# Patient Record
Sex: FEMALE | Race: WHITE | NOT HISPANIC OR LATINO | Employment: UNEMPLOYED | URBAN - METROPOLITAN AREA
[De-identification: names, ages, dates, MRNs, and addresses within clinical notes are randomized per-mention and may not be internally consistent; named-entity substitution may affect disease eponyms.]

---

## 2019-05-10 RX ORDER — BACLOFEN 10 MG/1
10 TABLET ORAL 3 TIMES DAILY
Status: ON HOLD | COMMUNITY
End: 2019-05-20

## 2019-05-10 RX ORDER — VALACYCLOVIR HYDROCHLORIDE 1 G/1
1000 TABLET, FILM COATED ORAL 2 TIMES DAILY
COMMUNITY
End: 2019-05-13

## 2019-05-10 RX ORDER — ROPINIROLE 3 MG/1
3 TABLET, FILM COATED ORAL NIGHTLY
COMMUNITY
End: 2022-06-07 | Stop reason: SDUPTHER

## 2019-05-10 RX ORDER — LISINOPRIL AND HYDROCHLOROTHIAZIDE 25; 20 MG/1; MG/1
1 TABLET ORAL NIGHTLY
COMMUNITY
End: 2022-03-31 | Stop reason: SDUPTHER

## 2019-05-10 RX ORDER — TRAZODONE HYDROCHLORIDE 50 MG/1
50 TABLET ORAL NIGHTLY
COMMUNITY
End: 2019-05-13

## 2019-05-13 ENCOUNTER — OFFICE VISIT (OUTPATIENT)
Dept: NEUROSURGERY | Facility: CLINIC | Age: 54
End: 2019-05-13

## 2019-05-13 VITALS — HEIGHT: 66 IN | RESPIRATION RATE: 17 BRPM | BODY MASS INDEX: 33.94 KG/M2 | WEIGHT: 211.2 LBS

## 2019-05-13 DIAGNOSIS — G89.29 CHRONIC BILATERAL LOW BACK PAIN WITH RIGHT-SIDED SCIATICA: Primary | ICD-10-CM

## 2019-05-13 DIAGNOSIS — M54.41 CHRONIC BILATERAL LOW BACK PAIN WITH RIGHT-SIDED SCIATICA: Primary | ICD-10-CM

## 2019-05-13 DIAGNOSIS — Z98.1 HISTORY OF LUMBAR FUSION: ICD-10-CM

## 2019-05-13 DIAGNOSIS — F32.A DEPRESSION, UNSPECIFIED DEPRESSION TYPE: ICD-10-CM

## 2019-05-13 DIAGNOSIS — M47.816 LUMBAR SPONDYLOSIS: ICD-10-CM

## 2019-05-13 PROCEDURE — 99204 OFFICE O/P NEW MOD 45 MIN: CPT | Performed by: NEUROLOGICAL SURGERY

## 2019-05-13 RX ORDER — IBUPROFEN 800 MG/1
800 TABLET ORAL 3 TIMES DAILY
COMMUNITY
End: 2022-05-09 | Stop reason: SDUPTHER

## 2019-05-13 RX ORDER — GABAPENTIN 300 MG/1
300 CAPSULE ORAL DAILY
Status: ON HOLD | COMMUNITY
End: 2019-05-20

## 2019-05-13 NOTE — PATIENT INSTRUCTIONS
Before your next appointment with us please complete the following:  -Appointment with Dr. Negron for a couple injections.   -Appointment with Dr. Morocho-Psychologist.

## 2019-05-13 NOTE — H&P (VIEW-ONLY)
NAME: TESFAYE FRANCE   DOS: 2019  : 1965  PCP: Camelia Chen PA    Chief Complaint:    Chief Complaint   Patient presents with   • Low back & R-leg pain       History of Present Illness:  53 y.o. female   This is a patient with a history of complex back.  She underwent lumbar laminectomy and synovial cyst resection by Dr. Bernal some years ago and subsequently re-presented with an L4-5 lumbar interbody fusion.  She self reports that she did well with this she is had a recent left shoulder issue and has had complex back pain ever since.  She reports worsening right lower extremity pain that is threatening her job she is currently filing for disability and denies any symptoms of cauda equina syndrome the pain is mostly present all the time she denies any symptoms of cauda equina syndrome and it radiates to her anterior thigh as well as down her leg she is here for evaluation    PMHX  Allergies:  Allergies   Allergen Reactions   • Latex Rash     Medications    Current Outpatient Medications:   •  ibuprofen (ADVIL,MOTRIN) 800 MG tablet, Take 800 mg by mouth Every 6 (Six) Hours As Needed for Mild Pain ., Disp: , Rfl:   •  baclofen (LIORESAL) 10 MG tablet, Take 10 mg by mouth 3 (Three) Times a Day., Disp: , Rfl:   •  lisinopril-hydrochlorothiazide (PRINZIDE,ZESTORETIC) 20-25 MG per tablet, Take 1 tablet by mouth Daily., Disp: , Rfl:   •  rOPINIRole (REQUIP) 3 MG tablet, Take 3 mg by mouth Every Night., Disp: , Rfl:   Past Medical History:  Past Medical History:   Diagnosis Date   • Hypertension    • Low back pain    • RLS (restless legs syndrome)    • Vitamin D deficiency      Past Surgical History:  Past Surgical History:   Procedure Laterality Date   • LUMBAR FUSION Right 2013    L4-5 fusion- Dr. Kei Bernal   • LUMBAR SPINE SURGERY Left 2010    L-L4-5 hemilaminectomies, facetectomy, foraminotomy & synovial cyst resection- Dr. Kei Bernal    • TOTAL SHOULDER REPLACEMENT Left      Social  Hx:  Social History     Tobacco Use   • Smoking status: Never Smoker   • Smokeless tobacco: Never Used   Substance Use Topics   • Alcohol use: No     Frequency: Never   • Drug use: No     Family Hx:  Family History   Problem Relation Age of Onset   • Hypertension Mother    • Diabetes Father      Review of Systems:        Review of Systems   Constitutional: Positive for activity change, appetite change and fatigue. Negative for chills, diaphoresis, fever and unexpected weight change.   HENT: Negative for congestion, dental problem, drooling, ear discharge, ear pain, facial swelling, hearing loss, mouth sores, nosebleeds, postnasal drip, rhinorrhea, sinus pressure, sneezing, sore throat, tinnitus, trouble swallowing and voice change.    Eyes: Negative for photophobia, pain, discharge, redness, itching and visual disturbance.   Respiratory: Negative for apnea, cough, choking, chest tightness, shortness of breath, wheezing and stridor.    Cardiovascular: Negative for chest pain, palpitations and leg swelling.   Gastrointestinal: Negative for abdominal distention, abdominal pain, anal bleeding, blood in stool, constipation, diarrhea, nausea, rectal pain and vomiting.   Endocrine: Negative for cold intolerance, heat intolerance, polydipsia, polyphagia and polyuria.   Genitourinary: Negative for decreased urine volume, difficulty urinating, dysuria, enuresis, flank pain, frequency, genital sores, hematuria and urgency.   Musculoskeletal: Positive for arthralgias, back pain, gait problem, joint swelling and myalgias. Negative for neck pain and neck stiffness.   Skin: Negative for color change, pallor, rash and wound.   Allergic/Immunologic: Negative for environmental allergies, food allergies and immunocompromised state.   Neurological: Positive for weakness. Negative for dizziness, tremors, seizures, syncope, facial asymmetry, speech difficulty, light-headedness, numbness and headaches.   Hematological: Negative for  adenopathy. Does not bruise/bleed easily.   Psychiatric/Behavioral: Positive for decreased concentration and sleep disturbance. Negative for agitation, behavioral problems, confusion, dysphoric mood, hallucinations, self-injury and suicidal ideas. The patient is not nervous/anxious and is not hyperactive.    All other systems reviewed and are negative.       I have reviewed this note template and all pertinent parts of the review of systems social, family history, surgical history and medication list    Physical Examination:  Vitals:    05/13/19 0933   Resp: 17      General Appearance:   Well developed, well nourished, well groomed, alert, and cooperative.  Neurological examination:  Neurologic Exam  Vital signs were reviewed and documented in the chart  Patient appeared in good neurologic function with normal comprehension fluent speech  Mood and affect are normal  Sense of smell deferred    Pupils symmetric equally reactive funduscopic exam not visualized   Visual fields intact to confrontation  Extraocular movements intact  Face motor function is symmetric  Facial sensations normal  Hearing intact to finger rub hearing intact to finger rub  Tongue is midline  Palate symmetric  Swallowing normal  Shoulder shrug normal    Muscle bulk and tone normal  5 out of 5 strength no motor drift  Gait normal intact  Negative Romberg  No clonus long tract signs or myelopathy    Reflexes symmetric trace throughout  No edema noted and extremities skin appears normal    Straight leg raise sign absent  No signs of intrinsic hip dysfunction  Back is without any lesions or abnormality incision well-healed  Feet are warm and well perfused        Review of Imaging/DATA:  I reviewed an MRI of her lumbar spine she has fusion with 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 Pizarro is a 53 y.o. female   I had an extensive discussion with her  for at least 45 minutes.  She is a complex patient she is currently filing for disability she works 3 jobs and this is threatening her job she is frustrated to the point of being tearful during our visit and has a history of chronic opioid use in the past which she is resolving and pain and 2 prior back surgeries.  I try to set forth appropriate expectations I think she is likely to be a surgical candidate.  We need to set her up with a person to manage her chronic pain management needs she needs to see Dr. Gutierrez for psychological clearance before undergoing any additional surgeries and lastly she needs a lumbar myelogram and EMG to delineate whether or not she needs an L3-4 or an L2-3-4 adjacent level revision back surgery.    I talked about the surgery the surgical risks of the impact on her quality of life etc. she seemed a bit frustrated with me as I suspected that she thought that this would be an easier fix but I think she is can have problems that need ongoing management.  We will do everything we can to help her in a pleasure of our neurosurgical care him to see her back after the studies are complete.

## 2019-05-14 ENCOUNTER — TELEPHONE (OUTPATIENT)
Dept: NEUROSURGERY | Facility: CLINIC | Age: 54
End: 2019-05-14

## 2019-05-14 PROBLEM — M54.41 CHRONIC BILATERAL LOW BACK PAIN WITH RIGHT-SIDED SCIATICA: Status: ACTIVE | Noted: 2019-05-14

## 2019-05-14 PROBLEM — G89.29 CHRONIC BILATERAL LOW BACK PAIN WITH RIGHT-SIDED SCIATICA: Status: ACTIVE | Noted: 2019-05-14

## 2019-05-14 NOTE — TELEPHONE ENCOUNTER
Patient needs work note for missing yesterday. She was seen by Dr. Osorio.     Work note printed.  I will contact patient to see how she would like to obtain it.  Note to be faxed to [phone], rishi Viera.    Done

## 2019-05-15 ENCOUNTER — DOCUMENTATION (OUTPATIENT)
Dept: NEUROSURGERY | Facility: CLINIC | Age: 54
End: 2019-05-15

## 2019-05-20 ENCOUNTER — APPOINTMENT (OUTPATIENT)
Dept: CT IMAGING | Facility: HOSPITAL | Age: 54
End: 2019-05-20

## 2019-05-20 ENCOUNTER — HOSPITAL ENCOUNTER (OUTPATIENT)
Dept: NEUROLOGY | Facility: HOSPITAL | Age: 54
Discharge: HOME | End: 2019-05-20

## 2019-05-20 ENCOUNTER — HOSPITAL ENCOUNTER (OUTPATIENT)
Facility: HOSPITAL | Age: 54
Discharge: HOME | End: 2019-05-20
Attending: RADIOLOGY | Admitting: NEUROLOGICAL SURGERY

## 2019-05-20 VITALS
TEMPERATURE: 97.6 F | DIASTOLIC BLOOD PRESSURE: 85 MMHG | SYSTOLIC BLOOD PRESSURE: 109 MMHG | OXYGEN SATURATION: 100 % | BODY MASS INDEX: 33.94 KG/M2 | HEIGHT: 66 IN | HEART RATE: 74 BPM | WEIGHT: 211.2 LBS

## 2019-05-20 DIAGNOSIS — G89.29 CHRONIC BILATERAL LOW BACK PAIN WITH RIGHT-SIDED SCIATICA: ICD-10-CM

## 2019-05-20 DIAGNOSIS — M54.41 CHRONIC BILATERAL LOW BACK PAIN WITH RIGHT-SIDED SCIATICA: ICD-10-CM

## 2019-05-20 LAB — B-HCG UR QL: NEGATIVE

## 2019-05-20 PROCEDURE — 81025 URINE PREGNANCY TEST: CPT | Performed by: RADIOLOGY

## 2019-05-20 PROCEDURE — 62304 MYELOGRAPHY LUMBAR INJECTION: CPT | Performed by: RADIOLOGY

## 2019-05-20 PROCEDURE — 36415 COLL VENOUS BLD VENIPUNCTURE: CPT

## 2019-05-20 PROCEDURE — 95886 MUSC TEST DONE W/N TEST COMP: CPT

## 2019-05-20 PROCEDURE — 72132 CT LUMBAR SPINE W/DYE: CPT

## 2019-05-20 PROCEDURE — 0 IOPAMIDOL 41 % SOLUTION: Performed by: RADIOLOGY

## 2019-05-20 PROCEDURE — 95910 NRV CNDJ TEST 7-8 STUDIES: CPT

## 2019-05-20 RX ORDER — LIDOCAINE HYDROCHLORIDE 10 MG/ML
INJECTION, SOLUTION EPIDURAL; INFILTRATION; INTRACAUDAL; PERINEURAL AS NEEDED
Status: DISCONTINUED | OUTPATIENT
Start: 2019-05-20 | End: 2019-05-20 | Stop reason: HOSPADM

## 2019-05-21 ENCOUNTER — TELEPHONE (OUTPATIENT)
Dept: PAIN MEDICINE | Facility: CLINIC | Age: 54
End: 2019-05-21

## 2019-05-23 ENCOUNTER — TELEPHONE (OUTPATIENT)
Dept: PAIN MEDICINE | Facility: CLINIC | Age: 54
End: 2019-05-23

## 2019-05-30 ENCOUNTER — OFFICE VISIT (OUTPATIENT)
Dept: NEUROSURGERY | Facility: CLINIC | Age: 54
End: 2019-05-30

## 2019-05-30 VITALS
SYSTOLIC BLOOD PRESSURE: 120 MMHG | BODY MASS INDEX: 33.88 KG/M2 | WEIGHT: 210.8 LBS | RESPIRATION RATE: 16 BRPM | DIASTOLIC BLOOD PRESSURE: 90 MMHG | HEIGHT: 66 IN

## 2019-05-30 DIAGNOSIS — M48.062 SPINAL STENOSIS OF LUMBAR REGION WITH NEUROGENIC CLAUDICATION: Primary | ICD-10-CM

## 2019-05-30 DIAGNOSIS — M51.26 HNP (HERNIATED NUCLEUS PULPOSUS), LUMBAR: ICD-10-CM

## 2019-05-30 DIAGNOSIS — M51.36 DDD (DEGENERATIVE DISC DISEASE), LUMBAR: ICD-10-CM

## 2019-05-30 DIAGNOSIS — M54.16 LUMBAR RADICULOPATHY: ICD-10-CM

## 2019-05-30 PROCEDURE — 99215 OFFICE O/P EST HI 40 MIN: CPT | Performed by: NEUROLOGICAL SURGERY

## 2019-05-30 RX ORDER — GABAPENTIN 300 MG/1
300 CAPSULE ORAL 3 TIMES DAILY
Qty: 90 CAPSULE | Refills: 3 | OUTPATIENT
Start: 2019-05-30 | End: 2022-04-12 | Stop reason: ALTCHOICE

## 2019-05-30 NOTE — TELEPHONE ENCOUNTER
PER SUNNI-Gabapentin 300mg TID # 90-    3 refills called in to pt's pharmacy.     1 nightly for 3 days, 1 twice a day for 3 days, 1 three times a day thereafter      Please sign to reflect in epic

## 2019-05-30 NOTE — PROGRESS NOTES
NAME: TESFAYE FRANCE   DOS: 2019  : 1965  PCP: Glory Moody PA    Chief Complaint:    Chief Complaint   Patient presents with   • Low back pain- F/u Myelo & EMG       History of Present Illness:  53 y.o. female     I saw this very pleasant lady 53-year-old with a history of complex back issues she underwent a L4-5 laminectomy and subsequent lumbar fusion by Dr. Bernal she reported improvement of her pain and neurogenic claudication.  She had quite a bit of decreased range of motion her back for some time increasing back pain and subtle symptoms and overtones of neurogenic claudication including some leg pain as well as walking with the assistance of a cart for protracted periods of time.  She reports a significant bout of left L3-4 distribution pain that resolved with time I re-presented with intractable right lower extremity pain in the anterior thigh and probable L3 if not L4 distribution.  She reports some resolution of her leg symptoms she still has fatigue in her back and is here for follow-up after MRI, myelogram and EMG she denies any symptoms of cauda equina syndrome she is currently filing for disability  PMHX  Allergies:  Allergies   Allergen Reactions   • Latex Rash     Medications    Current Outpatient Medications:   •  ibuprofen (ADVIL,MOTRIN) 800 MG tablet, Take 800 mg by mouth 3 (Three) Times a Day., Disp: , Rfl:   •  lisinopril-hydrochlorothiazide (PRINZIDE,ZESTORETIC) 20-25 MG per tablet, Take 1 tablet by mouth Every Night., Disp: , Rfl:   •  rOPINIRole (REQUIP) 3 MG tablet, Take 3 mg by mouth Every Night., Disp: , Rfl:   Past Medical History:  Past Medical History:   Diagnosis Date   • Hypertension    • Low back pain    • RLS (restless legs syndrome)    • Vitamin D deficiency      Past Surgical History:  Past Surgical History:   Procedure Laterality Date   • INTERVENTIONAL RADIOLOGY PROCEDURE N/A 2019    Procedure: IR myelogram lumbar spine;  Surgeon: Jordy Moreira MD;   Location: [facility] INVASIVE LOCATION;  Service: Interventional Radiology   • LUMBAR FUSION Right 06/05/2013    L4-5 fusion- Dr. Kei Bernal   • LUMBAR SPINE SURGERY Left 05/11/2010    L4-5 hemilaminectomies, facetectomy, foraminotomy & synovial cyst resection- Dr. Kei Bernal    • TOTAL SHOULDER REPLACEMENT Left    • VARICOSE VEIN SURGERY       Social Hx:  Social History     Tobacco Use   • Smoking status: Never Smoker   • Smokeless tobacco: Never Used   Substance Use Topics   • Alcohol use: No     Frequency: Never   • Drug use: No     Family Hx:  Family History   Problem Relation Age of Onset   • Hypertension Mother    • Diabetes Father      Review of Systems:        Review of Systems   Constitutional: Negative for activity change, appetite change, chills, diaphoresis, fatigue, fever and unexpected weight change.   HENT: Negative for congestion, dental problem, drooling, ear discharge, ear pain, facial swelling, hearing loss, mouth sores, nosebleeds, postnasal drip, rhinorrhea, sinus pressure, sneezing, sore throat, tinnitus, trouble swallowing and voice change.    Eyes: Negative for photophobia, pain, discharge, redness, itching and visual disturbance.   Respiratory: Negative for apnea, cough, choking, chest tightness, shortness of breath, wheezing and stridor.    Cardiovascular: Negative for chest pain, palpitations and leg swelling.   Gastrointestinal: Negative for abdominal distention, abdominal pain, anal bleeding, blood in stool, constipation, diarrhea, nausea, rectal pain and vomiting.   Endocrine: Negative for cold intolerance, heat intolerance, polydipsia, polyphagia and polyuria.   Genitourinary: Negative for decreased urine volume, difficulty urinating, dysuria, enuresis, flank pain, frequency, genital sores, hematuria and urgency.   Musculoskeletal: Positive for back pain. Negative for arthralgias, gait problem, joint swelling, myalgias, neck pain and neck stiffness.   Skin: Negative for color  change, pallor, rash and wound.   Allergic/Immunologic: Negative for environmental allergies, food allergies and immunocompromised state.   Neurological: Negative for dizziness, tremors, seizures, syncope, facial asymmetry, speech difficulty, weakness, light-headedness, numbness and headaches.   Hematological: Negative for adenopathy. Does not bruise/bleed easily.   Psychiatric/Behavioral: Negative for agitation, behavioral problems, confusion, decreased concentration, dysphoric mood, hallucinations, self-injury, sleep disturbance and suicidal ideas. The patient is not nervous/anxious and is not hyperactive.    All other systems reviewed and are negative.           Physical Examination:  Vitals:    05/30/19 1145   BP: 120/90   Resp: 16      General Appearance:   Well developed, well nourished, well groomed, alert, and cooperative.  Neurological examination:  Neurologic Exam  Vital signs were reviewed and documented in the chart  Patient appeared in good neurologic function with normal comprehension fluent speech  Mood and affect are normal  Sense of smell deferred    Pupils symmetric equally reactive funduscopic exam not visualized   Visual fields intact to confrontation  Extraocular movements intact  Face motor function is symmetric  Facial sensations normal  Hearing intact to finger rub hearing intact to finger rub  Tongue is midline  Palate symmetric  Swallowing normal  Shoulder shrug normal    Muscle bulk and tone normal  5 out of 5 strength no motor drift  Gait normal intact  Negative Romberg  No clonus long tract signs or myelopathy    Reflexes symmetric trace throughout  No edema noted and extremities skin appears normal    Straight leg raise sign absent decreased range of motion in the legs though bilaterally at the hips,  No signs of intrinsic hip dysfunction  Back is without any lesions or abnormality  Feet are warm and well perfused    Back incisions well-healed    Review of Imaging/DATA:  I reviewed her  myelograms and she has intraforaminal disease at L2-3 on the right that is difficult to ascertain when compared to the MRI she does have significant spinal stenosis at 3 4 I reviewed the reports as well  Diagnoses/Plan:    Ms. Pizarro is a 53 y.o. female   I had an extensive discussion with her 45 minutes my concern is is that she has had 2 prior back surgeries now she has adjacent level disease at the 2 3 and 3 4 levels.  She denies any cauda equina syndrome she has multiple disc herniations at multiple levels and I really spent some time to explain expectations.  I do think she is a candidate for a 3 4 lumbar interbody fusion based on the fact that she has had a left-sided disc herniation as well as a far lateral right 3 4 disc interestingly however her right sided thigh pain is somewhat better but is still nagging her.  I like her to try lumbar epidural steroid block will work on some expectations setting I explained that if he were to operate on her she may need a two-level fusion at the level above and my concerns is is that multilevel degenerative disc issues like this could lead to multiple surgeries down the road is a pleasure to provide neurosurgical care we will see her back after her lumbar injections

## 2019-06-06 NOTE — PROGRESS NOTES
"Chief Complaint: \"Pain in my lower back and right leg.\"       History of Present Illness:   Patient: Ms. Griselda Pizarro, 53 y.o. female   Referring physician: Dr. Ayaz Osorio   Reason for referral: Consultation for intractable chronic lower back and right leg pain.   Pain history: Patient reports a 10-year history of pain, which began without incident. Patient underwent on 05/11/2010 left L4-L5 hemilaminectomy, facetectomy, foraminotomy and synovial cyst resection with Dr. Kei Bernal. On 06/05/2013, patient underwent L4-L5 PLIF with Dr. Kei Bernal. Patient reports some improvement after surgery. She reports worsening right lower extremity pain for the past 4-6 months. MRI of the lumbar spine reveals previous fusion with adjacent level disease at L3-L4 and intraforaminal disease at right L2-L3. Patient has been unable to work and is currently filing for disability   Pain description: constant pain with intermittent exacerbation, described as an aching sensation.   Radiation of pain: The lower back pain radiates into the lateral aspect of the right thigh  Pain intensity today: 5/10  Average pain intensity last week: 7/10  Pain intensity ranges from: 5/10 to 10/10  Aggravating factors: standing and walking  Alleviating factors: Pain decreases with ice, sitting and lying down     Associated symptoms:   Patient reports pain, numbness and weakness in the right lower extremity. Patient denies left-sided symptoms (last time she had pain, 6 months ago, stopped after she quit working)  Patient denies any new bladder or bowel problems.   Patient reports difficulties with her balance but denies recent falls     Review of previous therapies and additional medical records:  Griselda Pizarro has already failed the following measures, including:   Conservative measures: oral analgesics, physical therapy, ice and heat   Interventional measures: None  Surgical measures:   On 05/11/2010: left L4-5 hemilaminectomy, facetectomy, " foraminotomy and synovial cyst resection with Dr. Kei Bernal   On 06/05/2013: L4-L5 PLIF with Dr. Kei Bernal  Griselda Pizarro underwent neurosurgical consultation with Dr. Osorio on 05/13/2019, and was found to be a surgical candidate for additional work up with potential adjacent level decompression  Griselda Pizarro is a healthy adult other than her chronic pain.  In terms of current analgesics, Griselda Pizarro takes: ibuprofen 800 mg tid, tylenol 500 mg tid, baclofen, gabapentin. Patient also takes Requip  I have reviewed [facility] Report #[id number] consistent to medication reconciliation.     Global Pain Scale 06-11 2019                 Pain  20                 Feelings  0                 Clinical outcomes  22                 Activities  17                 GPS Total:  59                    Review of Diagnostic Studies:    MRI of the lumbar spine with and without contrast on April 11, 2019, radiology report: There is fusion at L4-L5. There is mild retrolisthesis of L1 on L2 and L2 on L3. No evidence of fracture. Axial imaging:   L2-L3: Annular bulge. Facet arthropathy and osteophytes. Severe right and moderate left neuroforaminal stenosis. There is moderate central canal stenosis with an AP diameter of 6 mm   L3-L4: Annular disc bulge. Facet arthropathy. Severe right and moderate left neuroforaminal stenosis.  Moderate central canal stenosis with the AP diameter of the thecal sac of 5 mm   L4-L5: L4 laminectomies and fusion.   L5-S1: Annular bulge. Facet arthropathy. No significant canal or NF stenosis     Review of Systems   Constitutional: Positive for fatigue.   Cardiovascular: Positive for leg swelling.   Musculoskeletal: Positive for arthralgias, back pain, gait problem and myalgias.   Psychiatric/Behavioral: Positive for sleep disturbance.   All other systems reviewed and are negative.        Patient Active Problem List   Diagnosis   • Chronic bilateral low back pain with right-sided sciatica   •  Postlaminectomy syndrome of lumbar region   • History of lumbar spinal fusion   • Lumbar facet arthropathy   • Restless legs syndrome (RLS)     Past Medical History:   Diagnosis Date   • Hypertension    • Low back pain    • RLS (restless legs syndrome)    • Vitamin D deficiency          Past Surgical History:   Procedure Laterality Date   • INTERVENTIONAL RADIOLOGY PROCEDURE N/A 5/20/2019    Procedure: IR myelogram lumbar spine;  Surgeon: Jordy Moreira MD;  Location: [facility] INVASIVE LOCATION;  Service: Interventional Radiology   • LUMBAR FUSION Right 06/05/2013    L4-5 fusion- Dr. Kei Bernal   • LUMBAR SPINE SURGERY Left 05/11/2010    L4-5 hemilaminectomies, facetectomy, foraminotomy & synovial cyst resection- Dr. Kei Bernal    • TOTAL SHOULDER REPLACEMENT Left    • VARICOSE VEIN SURGERY           Family History   Problem Relation Age of Onset   • Hypertension Mother    • Diabetes Father          Social History     Socioeconomic History   • Marital status: Single     Spouse name: Not on file   • Number of children: Not on file   • Years of education: Not on file   • Highest education level: Not on file   Tobacco Use   • Smoking status: Never Smoker   • Smokeless tobacco: Never Used   Substance and Sexual Activity   • Alcohol use: No     Frequency: Never   • Drug use: No   • Sexual activity: Defer           Current Outpatient Medications:   •  fluticasone (FLONASE) 50 MCG/ACT nasal spray, , Disp: , Rfl:   •  gabapentin (NEURONTIN) 300 MG capsule, Take 1 capsule by mouth 3 (Three) Times a Day. 1 nightly for 3 days, 1 twice a day for 3 days, 1 three times a day thereafter, Disp: 90 capsule, Rfl: 3  •  ibuprofen (ADVIL,MOTRIN) 800 MG tablet, Take 800 mg by mouth 3 (Three) Times a Day., Disp: , Rfl:   •  lisinopril-hydrochlorothiazide (PRINZIDE,ZESTORETIC) 20-25 MG per tablet, Take 1 tablet by mouth Every Night., Disp: , Rfl:   •  rOPINIRole (REQUIP) 3 MG tablet, Take 3 mg by mouth Every Night., Disp: , Rfl:   "     Allergies   Allergen Reactions   • Latex Rash         /78 (BP Location: Left arm, Patient Position: Sitting)   Pulse 76   Temp 97.1 °F (36.2 °C) (Temporal)   Resp 18   Ht 167.6 cm (66\")   Wt 95.7 kg (211 lb)   SpO2 98%   BMI 34.06 kg/m²       Physical Exam:  Constitutional: Patient is oriented to person, place, and time. Patient appears well-developed and well-nourished.   HEENT: Head: Normocephalic and atraumatic. Eyes: Conjunctivae and lids are normal. Pupils: Equal, round, reactive to light.   Neck: Trachea normal. Neck supple. No JVD present.   Pulmonary Respiratory effort: No increased work of breathing or signs of respiratory distress. Auscultation of lungs: Clear to auscultation.   Cardiovascular Auscultation of heart: Normal rate and rhythm, normal S1 and S2, no murmurs.   Peripheral vascular exam: Femoral: right 2+, left 2+. Posterior tibialis: right 2+ and left 2+. Dorsalis pedis: right 2+ and left 2+. No edema.   Musculoskeletal   Gait and station: Gait evaluation demonstrated limping  Lumbar spine: Passive and active range of motion are almost full and without pain. Extension, flexion, lateral flexion, rotation of the lumbar spine did not increase or reproduce pain. Lumbar facet joint loading maneuvers are negative.   Arik test and Gaenslen's test are negative   Piriformis maneuvers are negative   Palpation of the bilateral ischial tuberosities, unrevealing   Palpation of the bilateral greater trochanter, unrevealing   Examination of the Iliotibial band: unrevealing   Hip joints: The range of motion of the hip joints is full and without pain   Neurological: Patient is alert and oriented to person, place, and time. Speech: speech is normal. Cortical function: Normal mental status.   Cranial nerves: Cranial nerves 2-12 intact.   Reflex Scores:  Right patellar: 2+  Left patellar: 2+  Right Achilles: 2+  Left Achilles: 2+  Motor strength: 5/5  Motor Tone: normal tone.   Involuntary " movements: none.   Superficial/Primitive Reflexes: primitive reflexes were absent.   Right Cedillo: absent  Left Cedillo: absent  Right ankle clonus: absent  Left ankle clonus: absent   Negative long tract signs. Straight leg raising test is negative. Femoral stretch sign is negative.   Sensation: No sensory loss. Sensory exam: intact to light touch, intact pain and temperature sensation, intact vibration sensation and normal proprioception.   Coordination: Normal finger to nose and heel to shin. Normal balance and negative Romberg's sign   Skin and subcutaneous tissue: Skin is warm and intact. No rash noted. No cyanosis.   Psychiatric: Judgment and insight: Normal. Orientation to person, place and time: Normal. Recent and remote memory: Intact. Mood and affect: Normal.     ASSESSMENT:   1. Postlaminectomy syndrome of lumbar region    2. History of lumbar spinal fusion    3. Lumbar facet arthropathy    4. Restless legs syndrome (RLS)      PLAN/MEDICAL DECISION MAKING: I had a lengthy conversation with Ms. Griselda Pizarro regarding her chronic pain condition and potential therapeutic options including risks, benefits, alternative therapies, to name a few. Patient underwent on 05/11/2010 left L4-L5 hemilaminectomy, facetectomy, foraminotomy and synovial cyst resection with Dr. Kei Bernal. Later on, on 06/05/2013, she underwent L4-L5 PLIF with Dr. Kei Bernal. She reports worsening right lower extremity pain. MRI of the lumbar spine reveals previous fusion with adjacent level disease at L3-L4 and intraforaminal disease at right L2-L3. Patient has failed to obtain pain relief with conservative measures and previous surgical interventions, as referenced above. I have reviewed all available patient's medical records as well as previous therapies as referenced above.  Therefore, I have proposed the following plan:  1. Pharmacological measures: Reviewed. Discussed. Patient takes ibuprofen 800 mg tid, tylenol 500 mg tid,  baclofen, gabapentin. Patient also takes Requip. Patient has declined additional pharmacological measures   2. Interventional pain management measures: Patient will be scheduled for diagnostic and therapeutic right L2-L3 and right L3-L4 transforaminal epidural steroid injections. We may repeat epidurals depending on patient's outcome.  Patient will follow-up with Dr. Osorio thereafter. Before undergoing any additional surgeries, Dr. Osorio has recommended a lumbar myelogram and EMG/NCV to delineate as to whether an L3-L4 or an L2-L3-L4 adjacent level revision back surgery would be necessary. Patient will remain off work as recommended by Dr. Osorio  3. Long-term rehabilitation efforts:  A. The patient does not have a history of falls. I did complete a risk assessment for falls.  B. Patient will start a comprehensive physical therapy program for water therapy, therapeutic exercise, core strengthening, gait and balance training, neurodynamics, myofascial release, cupping and dry needling once pain is under control  C. Start an exercise program such as water therapy  D. Contrast therapy: Apply ice-packs for 15-20 minutes, followed by heating pads for 15-20 minutes to affected area   E. Patient has been referred to Dr. Katie Morocho   4.  The patient and her family have been instructed to contact my office with any questions or difficulties. The patient understands the plan and agrees to proceed accordingly.       Patient Care Team:  Glory Moody PA as PCP - General (Family Medicine)  Ayaz Osorio MD as Consulting Physician (Neurosurgery)  Dihn Negron MD as Consulting Physician (Pain Medicine)     No orders of the defined types were placed in this encounter.        No future appointments.      Dinh Negron MD     EMR Dragon/Transcription disclaimer:  Much of this encounter note is an electronic transcription of spoken language to printed text. Electronic transcription of spoken language may permit  erroneous, or at times, nonsensical words or phrases to be inadvertently transcribed. Although I have reviewed the note for such errors, some may still exist.

## 2019-06-07 PROBLEM — M47.816 LUMBAR FACET ARTHROPATHY: Status: ACTIVE | Noted: 2019-06-07

## 2019-06-07 PROBLEM — Z98.1 HISTORY OF LUMBAR SPINAL FUSION: Status: ACTIVE | Noted: 2019-06-07

## 2019-06-07 PROBLEM — G25.81 RESTLESS LEGS SYNDROME (RLS): Status: ACTIVE | Noted: 2019-06-07

## 2019-06-07 PROBLEM — M96.1 POSTLAMINECTOMY SYNDROME OF LUMBAR REGION: Status: ACTIVE | Noted: 2019-06-07

## 2019-06-11 ENCOUNTER — OFFICE VISIT (OUTPATIENT)
Dept: PAIN MEDICINE | Facility: CLINIC | Age: 54
End: 2019-06-11

## 2019-06-11 VITALS
DIASTOLIC BLOOD PRESSURE: 78 MMHG | RESPIRATION RATE: 18 BRPM | HEART RATE: 76 BPM | HEIGHT: 66 IN | OXYGEN SATURATION: 98 % | BODY MASS INDEX: 33.91 KG/M2 | WEIGHT: 211 LBS | TEMPERATURE: 97.1 F | SYSTOLIC BLOOD PRESSURE: 138 MMHG

## 2019-06-11 DIAGNOSIS — M47.816 LUMBAR FACET ARTHROPATHY: ICD-10-CM

## 2019-06-11 DIAGNOSIS — Z98.1 HISTORY OF LUMBAR SPINAL FUSION: ICD-10-CM

## 2019-06-11 DIAGNOSIS — M96.1 POSTLAMINECTOMY SYNDROME OF LUMBAR REGION: ICD-10-CM

## 2019-06-11 DIAGNOSIS — G25.81 RESTLESS LEGS SYNDROME (RLS): ICD-10-CM

## 2019-06-11 DIAGNOSIS — M96.1 POSTLAMINECTOMY SYNDROME OF LUMBAR REGION: Primary | ICD-10-CM

## 2019-06-11 PROCEDURE — 99204 OFFICE O/P NEW MOD 45 MIN: CPT | Performed by: ANESTHESIOLOGY

## 2019-06-11 RX ORDER — FLUTICASONE PROPIONATE 50 MCG
SPRAY, SUSPENSION (ML) NASAL
COMMUNITY
Start: 2019-06-05 | End: 2022-04-12 | Stop reason: SDUPTHER

## 2019-07-01 ENCOUNTER — OUTSIDE FACILITY SERVICE (OUTPATIENT)
Dept: PAIN MEDICINE | Facility: CLINIC | Age: 54
End: 2019-07-01

## 2019-07-01 PROCEDURE — 99152 MOD SED SAME PHYS/QHP 5/>YRS: CPT | Performed by: ANESTHESIOLOGY

## 2019-07-01 PROCEDURE — 64484 NJX AA&/STRD TFRM EPI L/S EA: CPT | Performed by: ANESTHESIOLOGY

## 2019-07-01 PROCEDURE — 64483 NJX AA&/STRD TFRM EPI L/S 1: CPT | Performed by: ANESTHESIOLOGY

## 2019-07-02 ENCOUNTER — TELEPHONE (OUTPATIENT)
Dept: PAIN MEDICINE | Facility: CLINIC | Age: 54
End: 2019-07-02

## 2019-07-02 NOTE — TELEPHONE ENCOUNTER
Patient had dx/tx right L2-L3 and right L3-L4 TFESI on 07/01/2019. Called patient to f/u post procedure. Reached voicemail. Left message for return call

## 2019-09-30 ENCOUNTER — TELEPHONE (OUTPATIENT)
Dept: NEUROSURGERY | Facility: CLINIC | Age: 54
End: 2019-09-30

## 2019-11-04 ENCOUNTER — OFFICE VISIT (OUTPATIENT)
Dept: NEUROSURGERY | Facility: CLINIC | Age: 54
End: 2019-11-04

## 2019-11-04 VITALS
TEMPERATURE: 97.2 F | BODY MASS INDEX: 35.52 KG/M2 | DIASTOLIC BLOOD PRESSURE: 70 MMHG | HEIGHT: 66 IN | SYSTOLIC BLOOD PRESSURE: 128 MMHG | WEIGHT: 221 LBS

## 2019-11-04 DIAGNOSIS — M96.1 POSTLAMINECTOMY SYNDROME OF LUMBAR REGION: ICD-10-CM

## 2019-11-04 DIAGNOSIS — Z98.1 HISTORY OF LUMBAR SPINAL FUSION: Primary | ICD-10-CM

## 2019-11-04 PROCEDURE — 99214 OFFICE O/P EST MOD 30 MIN: CPT | Performed by: PHYSICIAN ASSISTANT

## 2019-11-04 NOTE — PROGRESS NOTES
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  Patient has not worked for about 6 months.  Patient has a history of lumbar laminectomy in May 2010 with Dr. Bernal.  Patient had continued degenerative changes that arrived and underwent a lumbar fusion at L4-5 in 2013.  Patient states that since that time patient is never been \"right\" in her low back.  Patient does notice that over the last few years her pain has increased and she has had more more walking intolerance.  The worst of which was starting in February of this year that caused her to be reevaluated with the spine surgeons.    In May she had a MRI and CT myelogram done that showed degenerative changes and progression of adjacent level problems.  Dr. Osorio explained with her very explicitly the surgeries that would be required to \"fix\" this problem.  Her Dr. Briceno both agreed that conservatism was the best management.    At this time she is able to have good and bad days but is in constant low-grade pain.  Some days she has exquisite right lower extremity pain after \"doing too much\".     She is back today to discuss early FCI and I do think this is reasonable.  Patient currently is unable to do her job due to the pain but if she were to undergo the multiple level lumbar fusion she would be disqualified from doing that type of work anyhow.    Review of Systems   Constitutional: Negative for activity change, appetite change, chills, diaphoresis, fatigue, fever and unexpected weight change.   HENT: Negative for congestion, dental problem, drooling, ear discharge, ear pain, facial swelling, hearing loss, mouth sores, nosebleeds, postnasal drip, rhinorrhea, sinus pressure, sneezing, sore throat, tinnitus, trouble swallowing and voice " change.    Eyes: Negative for photophobia, pain, discharge, redness, itching and visual disturbance.   Respiratory: Negative for apnea, cough, choking, chest tightness, shortness of breath, wheezing and stridor.    Cardiovascular: Negative for chest pain, palpitations and leg swelling.   Gastrointestinal: Negative for abdominal distention, abdominal pain, anal bleeding, blood in stool, constipation, diarrhea, nausea, rectal pain and vomiting.   Endocrine: Negative for cold intolerance, heat intolerance, polydipsia, polyphagia and polyuria.   Genitourinary: Negative for decreased urine volume, difficulty urinating, dysuria, enuresis, flank pain, frequency, genital sores, hematuria and urgency.   Musculoskeletal: Positive for back pain. Negative for arthralgias, gait problem, joint swelling, myalgias, neck pain and neck stiffness.   Skin: Negative for color change, pallor, rash and wound.   Allergic/Immunologic: Negative for environmental allergies, food allergies and immunocompromised state.   Neurological: Negative for dizziness, tremors, seizures, syncope, facial asymmetry, speech difficulty, weakness, light-headedness, numbness and headaches.   Hematological: Negative for adenopathy. Does not bruise/bleed easily.   Psychiatric/Behavioral: Negative for agitation, behavioral problems, confusion, decreased concentration, dysphoric mood, hallucinations, self-injury, sleep disturbance and suicidal ideas. The patient is not nervous/anxious and is not hyperactive.    All other systems reviewed and are negative.      Past Medical History:     Past Medical History:   Diagnosis Date   • Hypertension    • Low back pain    • RLS (restless legs syndrome)    • Vitamin D deficiency        Family History:     Family History   Problem Relation Age of Onset   • Hypertension Mother    • Diabetes Father        Social History:    reports that she has never smoked. She has never used smokeless tobacco. She reports that she does not  "drink alcohol or use drugs.   SMOKING STATUS: Non-smoker    Surgical History:     Past Surgical History:   Procedure Laterality Date   • INTERVENTIONAL RADIOLOGY PROCEDURE N/A 5/20/2019    Procedure: IR myelogram lumbar spine;  Surgeon: Jordy Moriera MD;  Location: [facility] INVASIVE LOC[facility];  Service: Interventional Radiology   • LUMBAR FUSION Right 06/05/2013    L4-5 fusion- Dr. Kei Bernal   • LUMBAR SPINE SURGERY Left 05/11/2010    L4-5 hemilaminectomies, facetectomy, foraminotomy & synovial cyst resection- Dr. Kei Bernal    • TOTAL SHOULDER REPLACEMENT Left    • VARICOSE VEIN SURGERY         Allergies:   Latex    Physical Exam:    Vital Signs:Ht 167.6 cm (66\")   BMI 34.06 kg/m²    BMI: Body mass index is 34.06 kg/m².    GENERAL:   The patient is in no acute distress, and is able to answer all questions appropriately.  Skin:  The incision is well-healed and well approximated.  No signs of infection, bleeding, or erythema.  Musculoskeletal:     strength is 5 out of 5 bilaterally.   Shoulder abduction is 5 out of 5.    Dorsiflexion is 5/5 Bilaterally  Plantarflexion is 5/5 bilaterally  Hip Flexion 5/5 bilaterally.    The patient´s gait is normal without antalgia.  Neurologic:   The patient is alert and oriented by 3.     Sensation is equal bilaterally with no deficit.      Reflexes:  2+ @ biceps, triceps, brachioradialis, as well as the patellar and Achilles tendon bilaterally.      Medical Decision Making    Data Review:   No new data reviewed at this visit    Diagnosis:   Chronic low back pain  With laminectomy syndrome  Status post L4-5 lumbar fusion    Treatment Options:   Patient is faring pretty well with injections with Dr. Negron and seems to be getting some good relief with the injections.  We are going to continue that.  I have written her letter for her prison stating that is unsure whether working in the kitchen has directly because the progression of disease, however typically " lifting bending and twisting on her lumbar fusion would cause degenerative changes at the levels above and below the fusion.     Patient will call us with any other questions or concerns.      No diagnosis found.

## 2019-12-27 ENCOUNTER — DOCUMENTATION (OUTPATIENT)
Dept: NEUROSURGERY | Facility: CLINIC | Age: 54
End: 2019-12-27

## 2020-01-20 ENCOUNTER — APPOINTMENT (OUTPATIENT)
Dept: WOMENS IMAGING | Facility: HOSPITAL | Age: 55
End: 2020-01-20

## 2020-01-20 PROCEDURE — 77067 SCR MAMMO BI INCL CAD: CPT | Performed by: RADIOLOGY

## 2020-11-12 NOTE — PROGRESS NOTES
"Chief Complaint: \"Pain in my lower back and right leg.\"       History of Present Illness:   Patient: Ms. Griselda Pizarro, 55 y.o. female originally referred by Dr. Ayza Osorio in consultation for intractable chronic lower back and right leg pain. Patient reports a 10-year history of pain, which began without incident. Patient has a history multiple lumbar spine surgeries. On 05/11/2010 she underwent left L4-L5 hemilaminectomy, facetectomy, foraminotomy and synovial cyst resection with Dr. Kei Bernal, and then on 06/05/2013, she underwent L4-L5 PLIF with Dr. Kei Bernal. She did have some improvement after surgery but has remained with consistent low grade back pain symptoms.  Patient was last seen on July 1, 2019, when she underwent diagnostic and therapeutic right L2-L3 and right L3-L4 transforaminal epidural steroid injections, from which she reports experiencing 60% pain relief and functional improvement lasting six months.  She also reports complete relief of the numbness she experienced in her right leg since epidural.  She reports an intermittent recurrence of her pain. She cancelled two follow up appointments thereafter and has not been seen since.  She underwent follow-up consultation with Robert Rodriguez PA-C on 11/4/2019, and was continuing to struggle with pain while standing and walking for long periods.  She had concerns with inability to complete her daily job duties, and since has been granted disability. She has not participated in any recent physical therapy.  She denies any significant changes in her medical history since she was last seen.  There are no new or recent pertinent diagnostics for review.  She returns today for reevaluation of her lower back and lower extremity pain.  Pain description: constant pain with intermittent exacerbation, described as an aching sensation.   Radiation of pain: The lower back pain radiates into the lateral aspect of the right thigh stopping above the " knee  Pain intensity today: 8/10  Average pain intensity last week: 8/10  Pain intensity ranges from: 6/10 to 10/10  Aggravating factors: Pain increases with standing and walking, protracted sitting.   Alleviating factors: Pain decreases with ice, stretching and lying down     Associated symptoms:   Patient reports pain and weakness in the right lower extremity.  She denies numbness since epidural.  Patient denies left-sided symptoms.  Patient denies any new bladder or bowel problems.   Patient reports difficulties with her balance but denies recent falls     Review of previous therapies and additional medical records:  Griselda Pizarro has already failed the following measures, including:   Conservative measures: oral analgesics, physical therapy, ice and heat   Interventional measures: 07/01/2019: DxTx right L2-L3 and right L3-L4 transforaminal epidural steroid injections  Surgical measures:   05/11/2010: left L4-5 hemilaminectomy, facetectomy, foraminotomy and synovial cyst resection with Dr. Kei Bernal   06/05/2013: L4-L5 PLIF with Dr. Kei Bernal  Griselda Pizarro underwent neurosurgical consultation with Dr. Osorio on 05/13/2019, and was found to be a surgical candidate for potential adjacent level decompression.  Griselda Pizarro is a healthy adult other than her chronic pain.  In terms of current analgesics, Griselda Pizarro takes: ibuprofen. Patient also takes Requip  I have reviewed Jonathan Report #[id number] consistent to medication reconciliation.     Global Pain Scale 06-11 2019 11-16  2020               Pain  20  20               Feelings  0  5               Clinical outcomes  22  18               Activities  17  19               GPS Total:  59  62                  Review of Diagnostic Studies:    CT LUMBAR SPINE WITH CONTRAST-05/20/2019: radiology report:  advanced degenerative change, postoperative changes related to posterior lumbar fusion of L4 and L5.  Despite this, the L4 vertebral body is slightly  anterior displaced relative to L5. There is also scoliosis with the convexity to the left. Severe degenerative changes at L1-L2 and L2-L3.    There is severe osteoarthritis as well as scoliosis of the lumbar spine.  Most significant findings are at L3-L4 where there is a bulging annulus, subluxation, with facet and ligamentous hypertrophy which attributes to high-grade central stenosis.  There is a leftward disc protrusion effacing the leftward aspect of the dural sac at L3-L4.  EMG/NCV of the bilateral lower extremities on 5/20/2019: Chronic L3-L4 radiculopathy on the right.  No evidence of radiculopathy seen on the left.  MRI of the lumbar spine with and without contrast on April 11, 2019, radiology report: There is fusion at L4-L5. There is mild retrolisthesis of L1 on L2 and L2 on L3. No evidence of fracture. Axial imaging:   L2-L3: Annular bulge. Facet arthropathy and osteophytes. Severe right and moderate left neuroforaminal stenosis. There is moderate central canal stenosis with an AP diameter of 6 mm   L3-L4: Annular disc bulge. Facet arthropathy. Severe right and moderate left neuroforaminal stenosis.  Moderate central canal stenosis with the AP diameter of the thecal sac of 5 mm   L4-L5: L4 laminectomies and fusion.   L5-S1: Annular bulge. Facet arthropathy. No significant canal or NF stenosis     Review of Systems   Musculoskeletal: Positive for arthralgias, back pain, gait problem, myalgias, neck pain and neck stiffness.   All other systems reviewed and are negative.        Patient Active Problem List   Diagnosis   • Chronic bilateral low back pain with right-sided sciatica   • Postlaminectomy syndrome of lumbar region   • History of lumbar spinal fusion   • Lumbar facet arthropathy   • Restless legs syndrome (RLS)     Past Medical History:   Diagnosis Date   • Hypertension    • Low back pain    • RLS (restless legs syndrome)    • Vitamin D deficiency          Past Surgical History:   Procedure  "Laterality Date   • INTERVENTIONAL RADIOLOGY PROCEDURE N/A 5/20/2019    Procedure: IR myelogram lumbar spine;  Surgeon: Jordy Moreira MD;  Location: [facility] INVASIVE LOCATION;  Service: Interventional Radiology   • LUMBAR FUSION Right 06/05/2013    L4-5 fusion- Dr. Kei Bernal   • LUMBAR SPINE SURGERY Left 05/11/2010    L4-5 hemilaminectomies, facetectomy, foraminotomy & synovial cyst resection- Dr. Kei Bernal    • TOTAL SHOULDER REPLACEMENT Left    • VARICOSE VEIN SURGERY           Family History   Problem Relation Age of Onset   • Hypertension Mother    • Diabetes Father          Social History     Socioeconomic History   • Marital status: Single     Spouse name: Not on file   • Number of children: Not on file   • Years of education: Not on file   • Highest education level: Not on file   Tobacco Use   • Smoking status: Never Smoker   • Smokeless tobacco: Never Used   Substance and Sexual Activity   • Alcohol use: No     Frequency: Never   • Drug use: No   • Sexual activity: Defer           Current Outpatient Medications:   •  fluticasone (FLONASE) 50 MCG/ACT nasal spray, , Disp: , Rfl:   •  ibuprofen (ADVIL,MOTRIN) 800 MG tablet, Take 800 mg by mouth 3 (Three) Times a Day., Disp: , Rfl:   •  lisinopril-hydrochlorothiazide (PRINZIDE,ZESTORETIC) 20-25 MG per tablet, Take 1 tablet by mouth Every Night., Disp: , Rfl:   •  rOPINIRole (REQUIP) 3 MG tablet, Take 3 mg by mouth Every Night., Disp: , Rfl:   •  gabapentin (NEURONTIN) 300 MG capsule, Take 1 capsule by mouth 3 (Three) Times a Day. 1 nightly for 3 days, 1 twice a day for 3 days, 1 three times a day thereafter, Disp: 90 capsule, Rfl: 3      Allergies   Allergen Reactions   • Latex Rash         /85   Pulse 71   Temp 97.7 °F (36.5 °C)   Ht 167.6 cm (65.98\")   Wt 111 kg (243 lb 12.8 oz)   SpO2 97%   BMI 39.37 kg/m²       Physical Exam:  Constitutional: Patient is oriented to person, place, and time. Patient appears well-developed and " well-nourished.   HEENT: Head: Normocephalic and atraumatic. Eyes: Conjunctivae and lids are normal. Pupils: Equal, round, reactive to light.   Neck: Trachea normal. Neck supple. No JVD present.   Pulmonary Respiratory effort: No increased work of breathing or signs of respiratory distress. Auscultation of lungs: Clear to auscultation.   Cardiovascular Auscultation of heart: Normal rate and rhythm, normal S1 and S2, no murmurs.   Peripheral vascular exam: Femoral: right 2+, left 2+. Posterior tibialis: right 2+ and left 2+. Dorsalis pedis: right 2+ and left 2+. No edema.   Musculoskeletal   Gait and station: Gait evaluation demonstrated an antalgic gait. Patient is able to walk on heels and toes.  Lumbar spine: Passive and active range of motion are almost full and without pain. Extension, flexion, lateral flexion, rotation of the lumbar spine did not increase or reproduce pain. Lumbar facet joint loading maneuvers are negative.   Arik test and Gaenslen's test are negative   Piriformis maneuvers are negative   Palpation of the bilateral ischial tuberosities, unrevealing   Palpation of the bilateral greater trochanter, unrevealing   Examination of the Iliotibial band: unrevealing   Hip joints: The range of motion of the hip joints is full and without pain   Neurological: Patient is alert and oriented to person, place, and time. Speech: speech is normal. Cortical function: Normal mental status.   Cranial nerves: Cranial nerves 2-12 intact.   Reflex Scores:  Right patellar: 2+  Left patellar: 2+  Right Achilles: 2+  Left Achilles: 2+  Motor strength: 5/5  Motor Tone: normal tone.   Involuntary movements: none.   Superficial/Primitive Reflexes: primitive reflexes were absent.   Right Cedillo: absent  Left Cedillo: absent  Right ankle clonus: absent  Left ankle clonus: absent   Negative long tract signs. Straight leg raising test is negative. Femoral stretch sign is negative.   Sensation: No sensory loss. Sensory  exam: intact to light touch, intact pain and temperature sensation, intact vibration sensation and normal proprioception.   Coordination: Normal finger to nose and heel to shin. Normal balance and negative Romberg's sign   Skin and subcutaneous tissue: Skin is warm and intact. No rash noted. No cyanosis.   Psychiatric: Judgment and insight: Normal. Orientation to person, place and time: Normal. Recent and remote memory: Intact. Mood and affect: Normal.     ASSESSMENT:   1. Postlaminectomy syndrome of lumbar region    2. History of lumbar spinal fusion    3. Lumbar facet arthropathy    4. Restless legs syndrome (RLS)      PLAN/MEDICAL DECISION MAKING: I had a lengthy conversation with Ms. Griselda Pizarro regarding her chronic pain condition and potential therapeutic options including risks, benefits, alternative therapies, to name a few. Patient has a history multiple lumbar spine surgeries. On 05/11/2010 she underwent left L4-L5 hemilaminectomy, facetectomy, foraminotomy and synovial cyst resection with Dr. Kei Bernal, and then on 06/05/2013, she underwent L4-L5 PLIF with Dr. Kei Bernal. She did have some improvement after surgery but has remained with consistent low grade back pain symptoms. MRI of the lumbar spine reveals previous fusion with adjacent level disease at L3-L4 and intraforaminal disease at right L2-L3.  She responded significantly well to previous transforaminal epidural steroid injection, as referenced under HPI.  In addition, she reports complete resolution of the numbness she experienced in her right lower extremity since epidural.  Patient has failed to obtain pain relief with conservative measures and previous surgical interventions, as referenced above. I have reviewed all available patient's medical records as well as previous therapies as referenced above.  Therefore, I have proposed the following plan:  1. Pharmacological measures: Reviewed. Discussed. Patient takes ibuprofen,  gabapentin. Patient also takes Requip. Patient has declined additional pharmacological measures   2. Interventional pain management measures: Patient will be scheduled for right L2-L3 and right L3-L4 transforaminal epidural steroid injections. We may repeat epidurals depending on patient's outcome.  Patient will follow-up with Dr. Osorio thereafter.  She has been found to be a potential surgical candidate.  3. Long-term rehabilitation efforts:  A. The patient does not have a history of falls. I did complete a risk assessment for falls.  B. Patient will start a comprehensive physical therapy program for water therapy, therapeutic exercise, core strengthening, gait and balance training, neurodynamics, myofascial release, cupping and dry needling once pain is under control  C. Start an exercise program such as water therapy  D. Contrast therapy: Apply ice-packs for 15-20 minutes, followed by heating pads for 15-20 minutes to affected area   E. Patient has been referred to Dr. Katie Morocho   4.  The patient and her family have been instructed to contact my office with any questions or difficulties. The patient understands the plan and agrees to proceed accordingly.       Patient Care Team:  Glory Moody PA as PCP - General (Family Medicine)  Ayaz Osorio MD as Consulting Physician (Neurosurgery)  Dinh Negron MD as Consulting Physician (Pain Medicine)     No orders of the defined types were placed in this encounter.        No future appointments.      NADIYA Daniel Dragon/Transcription disclaimer:  Much of this encounter note is an electronic transcription of spoken language to printed text. Electronic transcription of spoken language may permit erroneous, or at times, nonsensical words or phrases to be inadvertently transcribed. Although I have reviewed the note for such errors, some may still exist.

## 2020-11-16 ENCOUNTER — OFFICE VISIT (OUTPATIENT)
Dept: PAIN MEDICINE | Facility: CLINIC | Age: 55
End: 2020-11-16

## 2020-11-16 VITALS
HEIGHT: 66 IN | OXYGEN SATURATION: 97 % | BODY MASS INDEX: 39.18 KG/M2 | TEMPERATURE: 97.7 F | DIASTOLIC BLOOD PRESSURE: 85 MMHG | HEART RATE: 71 BPM | WEIGHT: 243.8 LBS | SYSTOLIC BLOOD PRESSURE: 122 MMHG

## 2020-11-16 DIAGNOSIS — M96.1 POSTLAMINECTOMY SYNDROME OF LUMBAR REGION: ICD-10-CM

## 2020-11-16 DIAGNOSIS — M47.816 LUMBAR FACET ARTHROPATHY: ICD-10-CM

## 2020-11-16 DIAGNOSIS — M96.1 POSTLAMINECTOMY SYNDROME OF LUMBAR REGION: Primary | ICD-10-CM

## 2020-11-16 DIAGNOSIS — G25.81 RESTLESS LEGS SYNDROME (RLS): ICD-10-CM

## 2020-11-16 DIAGNOSIS — Z98.1 HISTORY OF LUMBAR SPINAL FUSION: ICD-10-CM

## 2020-11-16 PROCEDURE — 99214 OFFICE O/P EST MOD 30 MIN: CPT | Performed by: NURSE PRACTITIONER

## 2022-03-31 RX ORDER — IBUPROFEN 800 MG/1
800 TABLET ORAL 4 TIMES DAILY PRN
Qty: 30 TABLET | Refills: 0 | Status: SHIPPED | OUTPATIENT
Start: 2022-03-31 | End: 2022-04-12 | Stop reason: SDUPTHER

## 2022-03-31 RX ORDER — LISINOPRIL AND HYDROCHLOROTHIAZIDE 25; 20 MG/1; MG/1
20 TABLET ORAL DAILY
Qty: 30 TABLET | Refills: 0 | Status: SHIPPED | OUTPATIENT
Start: 2022-03-31 | End: 2022-04-12 | Stop reason: SDUPTHER

## 2022-03-31 RX ORDER — IBUPROFEN 800 MG/1
800 TABLET ORAL 4 TIMES DAILY PRN
COMMUNITY
Start: 2020-01-06 | End: 2022-03-31 | Stop reason: SDUPTHER

## 2022-03-31 RX ORDER — LISINOPRIL AND HYDROCHLOROTHIAZIDE 25; 20 MG/1; MG/1
20 TABLET ORAL DAILY
COMMUNITY
Start: 2020-01-06 | End: 2022-03-31 | Stop reason: SDUPTHER

## 2022-04-12 ENCOUNTER — OFFICE VISIT (OUTPATIENT)
Dept: FAMILY MEDICINE CLINIC | Facility: CLINIC | Age: 57
End: 2022-04-12

## 2022-04-12 VITALS
OXYGEN SATURATION: 99 % | HEART RATE: 90 BPM | DIASTOLIC BLOOD PRESSURE: 82 MMHG | SYSTOLIC BLOOD PRESSURE: 124 MMHG | BODY MASS INDEX: 41.4 KG/M2 | WEIGHT: 257.6 LBS | HEIGHT: 66 IN

## 2022-04-12 DIAGNOSIS — M51.36 LUMBAR DEGENERATIVE DISC DISEASE: ICD-10-CM

## 2022-04-12 DIAGNOSIS — E78.2 MIXED HYPERLIPIDEMIA: ICD-10-CM

## 2022-04-12 DIAGNOSIS — I10 ESSENTIAL (PRIMARY) HYPERTENSION: Primary | ICD-10-CM

## 2022-04-12 DIAGNOSIS — L30.9 ECZEMA, UNSPECIFIED TYPE: ICD-10-CM

## 2022-04-12 PROBLEM — M25.551 RIGHT HIP PAIN: Status: ACTIVE | Noted: 2022-04-12

## 2022-04-12 PROBLEM — E61.1 IRON DEFICIENCY: Status: ACTIVE | Noted: 2017-01-11

## 2022-04-12 PROBLEM — D53.1 MEGALOBLASTIC ANEMIA: Status: ACTIVE | Noted: 2017-01-11

## 2022-04-12 PROBLEM — M48.061 LUMBAR SPINAL STENOSIS: Status: ACTIVE | Noted: 2017-01-11

## 2022-04-12 PROBLEM — R53.83 OTHER FATIGUE: Status: ACTIVE | Noted: 2017-01-11

## 2022-04-12 PROBLEM — E55.9 VITAMIN D DEFICIENCY: Status: ACTIVE | Noted: 2017-01-11

## 2022-04-12 PROBLEM — G25.81 RLS (RESTLESS LEGS SYNDROME): Status: ACTIVE | Noted: 2017-01-11

## 2022-04-12 PROBLEM — J30.9 ALLERGIC RHINITIS DUE TO ALLERGEN: Status: ACTIVE | Noted: 2017-01-11

## 2022-04-12 PROBLEM — M51.369 LUMBAR DEGENERATIVE DISC DISEASE: Status: ACTIVE | Noted: 2022-04-12

## 2022-04-12 LAB
BILIRUB BLD-MCNC: NEGATIVE MG/DL
CLARITY, POC: CLEAR
COLOR UR: YELLOW
GLUCOSE UR STRIP-MCNC: NEGATIVE MG/DL
KETONES UR QL: ABNORMAL
LEUKOCYTE EST, POC: NEGATIVE
NITRITE UR-MCNC: NEGATIVE MG/ML
PH UR: 5.5 [PH] (ref 5–8)
PROT UR STRIP-MCNC: NEGATIVE MG/DL
RBC # UR STRIP: ABNORMAL /UL
SP GR UR: 1.03 (ref 1–1.03)
UROBILINOGEN UR QL: NORMAL

## 2022-04-12 PROCEDURE — 96372 THER/PROPH/DIAG INJ SC/IM: CPT | Performed by: PHYSICIAN ASSISTANT

## 2022-04-12 PROCEDURE — 99214 OFFICE O/P EST MOD 30 MIN: CPT | Performed by: PHYSICIAN ASSISTANT

## 2022-04-12 RX ORDER — KETOROLAC TROMETHAMINE 30 MG/ML
60 INJECTION, SOLUTION INTRAMUSCULAR; INTRAVENOUS ONCE
Status: COMPLETED | OUTPATIENT
Start: 2022-04-12 | End: 2022-04-12

## 2022-04-12 RX ORDER — CYCLOBENZAPRINE HCL 10 MG
10 TABLET ORAL 3 TIMES DAILY PRN
Qty: 30 TABLET | Refills: 1 | Status: SHIPPED | OUTPATIENT
Start: 2022-04-12 | End: 2022-11-03 | Stop reason: SDUPTHER

## 2022-04-12 RX ORDER — ROSUVASTATIN CALCIUM 10 MG/1
10 TABLET, COATED ORAL DAILY
COMMUNITY
End: 2022-04-12 | Stop reason: SDUPTHER

## 2022-04-12 RX ORDER — FLUTICASONE PROPIONATE 50 MCG
2 SPRAY, SUSPENSION (ML) NASAL
COMMUNITY
Start: 2020-01-06

## 2022-04-12 RX ORDER — ROSUVASTATIN CALCIUM 10 MG/1
10 TABLET, COATED ORAL DAILY
Qty: 90 TABLET | Refills: 1 | Status: SHIPPED | OUTPATIENT
Start: 2022-04-12

## 2022-04-12 RX ORDER — LISINOPRIL AND HYDROCHLOROTHIAZIDE 25; 20 MG/1; MG/1
1 TABLET ORAL DAILY
Qty: 30 TABLET | Refills: 5 | Status: SHIPPED | OUTPATIENT
Start: 2022-04-12 | End: 2022-07-27 | Stop reason: SDUPTHER

## 2022-04-12 RX ORDER — METHYLPREDNISOLONE 4 MG/1
TABLET ORAL
Qty: 19 TABLET | Refills: 0 | Status: SHIPPED | OUTPATIENT
Start: 2022-04-12 | End: 2022-04-22

## 2022-04-12 RX ADMIN — KETOROLAC TROMETHAMINE 60 MG: 30 INJECTION, SOLUTION INTRAMUSCULAR; INTRAVENOUS at 16:12

## 2022-04-12 NOTE — ASSESSMENT & PLAN NOTE
A Toradol injection in clinic today and a prescription for Medrol Dosepak as well as muscle relaxers.  Reassurance that there was no sign of UTI or blood in her urine indicating a kidney stone and I suspect her back pain is musculoskeletal.  She can continue her Advil as needed for this as well.

## 2022-04-12 NOTE — PROGRESS NOTES
"Chief Complaint  Hypertension (Patient needs a refill on her med.) and Back Pain (X2 days)    Subjective          Griselda Pizarro presents to [facility]  History of Present Illness patient comes in today because she needs her blood pressure medication refilled she also mentioned needing her cholesterol medicine and her steroid cream refilled for the eczema.  She is also reporting a 2-day history of back pain and wanted to make sure she did not have a kidney stone but she admits it may be musculoskeletal and she may have just pulled a muscle.    Objective     Vital Signs:   /82 (BP Location: Right arm, Patient Position: Sitting, Cuff Size: Large Adult)   Pulse 90   Ht 167.6 cm (66\")   Wt 117 kg (257 lb 9.6 oz)   SpO2 99%   BMI 41.58 kg/m²     Body mass index is 41.58 kg/m².    Review of Systems   Constitutional: Negative.    Respiratory: Negative.    Cardiovascular: Negative.    Genitourinary: Negative for difficulty urinating, dysuria, flank pain, frequency, hematuria and pelvic pressure.   Musculoskeletal: Positive for back pain.   Neurological: Negative.    Psychiatric/Behavioral: Negative.        Social History: reports that she has never smoked. She has never used smokeless tobacco. She reports that she does not drink alcohol and does not use drugs.      Current Outpatient Medications:   •  fluticasone (FLONASE) 50 MCG/ACT nasal spray, 2 sprays into the nostril(s) as directed by provider., Disp: , Rfl:   •  lisinopril-hydrochlorothiazide (PRINZIDE,ZESTORETIC) 20-25 MG per tablet, Take 1 tablet by mouth Daily. Needs appt., Disp: 30 tablet, Rfl: 5  •  rOPINIRole (REQUIP) 3 MG tablet, Take 3 mg by mouth Every Night., Disp: , Rfl:   •  rosuvastatin (CRESTOR) 10 MG tablet, Take 1 tablet by mouth Daily., Disp: 90 tablet, Rfl: 1  •  triamcinolone (KENALOG) 0.1 % ointment, Apply  topically to the appropriate area as directed 2 (Two) Times a Day., Disp: 80 g, Rfl: 3  •  " cyclobenzaprine (FLEXERIL) 10 MG tablet, Take 1 tablet by mouth 3 (Three) Times a Day As Needed for Muscle Spasms., Disp: 30 tablet, Rfl: 1  •  ibuprofen (ADVIL,MOTRIN) 800 MG tablet, Take 800 mg by mouth 3 (Three) Times a Day., Disp: , Rfl:   •  methylPREDNISolone (MEDROL) 4 MG tablet, Take 3 tablets by mouth Daily for 3 days, THEN 2 tablets Daily for 3 days, THEN 1 tablet Daily for 4 days., Disp: 19 tablet, Rfl: 0  No current facility-administered medications for this visit.    Allergies: Latex    Physical Exam  Constitutional:       Appearance: Normal appearance.   Cardiovascular:      Rate and Rhythm: Normal rate and regular rhythm.      Heart sounds: Normal heart sounds.   Pulmonary:      Effort: Pulmonary effort is normal.      Breath sounds: Normal breath sounds.   Abdominal:      General: Bowel sounds are normal.      Palpations: Abdomen is soft.   Musculoskeletal:         General: Tenderness present. Normal range of motion.      Cervical back: Normal range of motion.      Thoracic back: Tenderness present.      Lumbar back: Tenderness present.   Neurological:      General: No focal deficit present.      Mental Status: She is alert and oriented to person, place, and time.   Psychiatric:         Mood and Affect: Mood normal.             Assessment and Plan   Diagnoses and all orders for this visit:    1. Essential (primary) hypertension (Primary)  Assessment & Plan:  Continue present care no changes meds refilled.      2. Lumbar degenerative disc disease  Assessment & Plan:  A Toradol injection in clinic today and a prescription for Medrol Dosepak as well as muscle relaxers.  Reassurance that there was no sign of UTI or blood in her urine indicating a kidney stone and I suspect her back pain is musculoskeletal.  She can continue her Advil as needed for this as well.    Orders:  -     ketorolac (TORADOL) injection 60 mg  -     POC Urinalysis Dipstick    3. Eczema, unspecified type  Assessment & Plan:  Continue  present care no changes meds refilled.      4. Mixed hyperlipidemia  Assessment & Plan:  Continue present care no changes meds refilled      Other orders  -     cyclobenzaprine (FLEXERIL) 10 MG tablet; Take 1 tablet by mouth 3 (Three) Times a Day As Needed for Muscle Spasms.  Dispense: 30 tablet; Refill: 1  -     triamcinolone (KENALOG) 0.1 % ointment; Apply  topically to the appropriate area as directed 2 (Two) Times a Day.  Dispense: 80 g; Refill: 3  -     rosuvastatin (CRESTOR) 10 MG tablet; Take 1 tablet by mouth Daily.  Dispense: 90 tablet; Refill: 1  -     methylPREDNISolone (MEDROL) 4 MG tablet; Take 3 tablets by mouth Daily for 3 days, THEN 2 tablets Daily for 3 days, THEN 1 tablet Daily for 4 days.  Dispense: 19 tablet; Refill: 0  -     lisinopril-hydrochlorothiazide (PRINZIDE,ZESTORETIC) 20-25 MG per tablet; Take 1 tablet by mouth Daily. Needs appt.  Dispense: 30 tablet; Refill: 5          Follow Up   Return in about 6 months (around 10/12/2022) for Recheck.  Patient was given instructions and counseling regarding her condition or for health maintenance advice. Please see specific information pulled into the AVS if appropriate.     Glory Moody PA-C

## 2022-05-06 ENCOUNTER — TELEPHONE (OUTPATIENT)
Dept: FAMILY MEDICINE CLINIC | Facility: CLINIC | Age: 57
End: 2022-05-06

## 2022-05-06 NOTE — TELEPHONE ENCOUNTER
Incoming Refill Request      Medication requested (name and dose):     IBUPROFEN 800MG TABLETS    Pharmacy where request should be sent:     [facility]    Additional details provided by patient:     NONE    Best call back number:     [phone]    Does the patient have less than a 3 day supply:  [] Yes  [x] No    Lashanda Mcneal  05/06/22, 09:17 EDT

## 2022-05-09 RX ORDER — IBUPROFEN 800 MG/1
800 TABLET ORAL 3 TIMES DAILY
Qty: 90 TABLET | Refills: 0 | Status: SHIPPED | OUTPATIENT
Start: 2022-05-09 | End: 2022-07-27 | Stop reason: SDUPTHER

## 2022-06-07 RX ORDER — ROPINIROLE 3 MG/1
3 TABLET, FILM COATED ORAL NIGHTLY
Qty: 90 TABLET | Refills: 0 | Status: SHIPPED | OUTPATIENT
Start: 2022-06-07 | End: 2022-07-27 | Stop reason: SDUPTHER

## 2022-07-27 ENCOUNTER — TELEPHONE (OUTPATIENT)
Dept: FAMILY MEDICINE CLINIC | Facility: CLINIC | Age: 57
End: 2022-07-27

## 2022-07-27 RX ORDER — ROPINIROLE 3 MG/1
3 TABLET, FILM COATED ORAL NIGHTLY
Qty: 90 TABLET | Refills: 0 | Status: SHIPPED | OUTPATIENT
Start: 2022-07-27 | End: 2022-11-03 | Stop reason: SDUPTHER

## 2022-07-27 RX ORDER — IBUPROFEN 800 MG/1
800 TABLET ORAL 3 TIMES DAILY
Qty: 90 TABLET | Refills: 0 | Status: SHIPPED | OUTPATIENT
Start: 2022-07-27 | End: 2022-08-26 | Stop reason: SDUPTHER

## 2022-07-27 RX ORDER — LISINOPRIL AND HYDROCHLOROTHIAZIDE 25; 20 MG/1; MG/1
1 TABLET ORAL DAILY
Qty: 30 TABLET | Refills: 5 | Status: SHIPPED | OUTPATIENT
Start: 2022-07-27 | End: 2022-11-03 | Stop reason: SDUPTHER

## 2022-07-27 NOTE — TELEPHONE ENCOUNTER
Caller: Tesfaye Pizarro    Relationship: Self    Best call back number: [phone] (H)    Requested Prescriptions:   Requested Prescriptions     Pending Prescriptions Disp Refills   • ibuprofen (ADVIL,MOTRIN) 800 MG tablet 90 tablet 0     Sig: Take 1 tablet by mouth 3 (Three) Times a Day.   • lisinopril-hydrochlorothiazide (PRINZIDE,ZESTORETIC) 20-25 MG per tablet 30 tablet 5     Sig: Take 1 tablet by mouth Daily. Needs appt.   • rOPINIRole (REQUIP) 3 MG tablet 90 tablet 0     Sig: Take 1 tablet by mouth Every Night. Called in for 90 days Pt needs an appt w FASTING BW before anymore refills.        Pharmacy where request should be sent: [id number] [place], [place] - [address]  [address] - [phone]  - [phone] FX     Additional details provided by patient: TESFAYE IS OUT OF IBUPROFEN.  OTHERS NEED NEW REFILLS    Does the patient have less than a 3 day supply:  [x] Yes  [] No    Amalia Xiao   07/27/22 09:08 EDT

## 2022-07-27 NOTE — TELEPHONE ENCOUNTER
Patient states that [place] [facility] [place] and was diagnosed with cellulitis.  She said they gave her 2 antibiotics and she has run out. She states that she does not remember the name of the antibiotics. Patient would like a refill. There are no available appts this week with any of the providers.  She would like a call back. Ph: [phone]

## 2022-07-28 NOTE — TELEPHONE ENCOUNTER
Pt Contacted: Pt advised she must make an appt and be seen for this. Pt got mad and hung up on me.

## 2022-08-26 RX ORDER — IBUPROFEN 800 MG/1
800 TABLET ORAL 3 TIMES DAILY
Qty: 90 TABLET | Refills: 0 | Status: SHIPPED | OUTPATIENT
Start: 2022-08-26 | End: 2022-11-03 | Stop reason: SDUPTHER

## 2022-08-26 NOTE — TELEPHONE ENCOUNTER
Caller: Juarez Griselda Deisi    Relationship: Self    Best call back number: [phone]    Requested Prescriptions:   Requested Prescriptions     Pending Prescriptions Disp Refills   • ibuprofen (ADVIL,MOTRIN) 800 MG tablet 90 tablet 0     Sig: Take 1 tablet by mouth 3 (Three) Times a Day.        Pharmacy where request should be sent: [facility] #[id number] - [address]  & [address] [phone] [facility] [phone] FX     PATIENT ASKING FOR MORE REFILLS THAN ONE    Does the patient have less than a 3 day supply:  [] Yes  [x] No    Amalia Rodriguez Rep   08/26/22 11:06 EDT

## 2022-11-03 NOTE — TELEPHONE ENCOUNTER
Caller: Tesfaye Pizarro    Relationship: Self    Best call back number: [phone]    Requested Prescriptions:   Requested Prescriptions     Pending Prescriptions Disp Refills   • ibuprofen (ADVIL,MOTRIN) 800 MG tablet 90 tablet 0     Sig: Take 1 tablet by mouth 3 (Three) Times a Day.   • rOPINIRole (REQUIP) 3 MG tablet 90 tablet 0     Sig: Take 1 tablet by mouth Every Night. Called in for 90 days Pt needs an appt w FASTING BW before anymore refills.   • lisinopril-hydrochlorothiazide (PRINZIDE,ZESTORETIC) 20-25 MG per tablet 30 tablet 5     Sig: Take 1 tablet by mouth Daily. Needs appt.   • cyclobenzaprine (FLEXERIL) 10 MG tablet 30 tablet 1     Sig: Take 1 tablet by mouth 3 (Three) Times a Day As Needed for Muscle Spasms.        Pharmacy where request should be sent: [facility] [id number] [address] - [phone]  - [phone] FX     Additional details provided by patient: TESFAYE NEEDS FLEXERIL AND IBUPROFEN  ASAP      PLEASE SEND ALL TO [facility]     Does the patient have less than a 3 day supply:  [] Yes  [x] No    Amalia Xiao Rep   11/03/22 09:44 EDT

## 2022-11-04 RX ORDER — IBUPROFEN 800 MG/1
800 TABLET ORAL 3 TIMES DAILY
Qty: 90 TABLET | Refills: 0 | Status: SHIPPED | OUTPATIENT
Start: 2022-11-04

## 2022-11-04 RX ORDER — CYCLOBENZAPRINE HCL 10 MG
10 TABLET ORAL 3 TIMES DAILY PRN
Qty: 270 TABLET | Refills: 0 | Status: SHIPPED | OUTPATIENT
Start: 2022-11-04

## 2022-11-04 RX ORDER — ROPINIROLE 3 MG/1
3 TABLET, FILM COATED ORAL NIGHTLY
Qty: 90 TABLET | Refills: 0 | Status: SHIPPED | OUTPATIENT
Start: 2022-11-04

## 2022-11-04 RX ORDER — LISINOPRIL AND HYDROCHLOROTHIAZIDE 25; 20 MG/1; MG/1
1 TABLET ORAL DAILY
Qty: 90 TABLET | Refills: 0 | Status: SHIPPED | OUTPATIENT
Start: 2022-11-04

## 2023-07-31 ENCOUNTER — TELEPHONE (OUTPATIENT)
Dept: FAMILY MEDICINE CLINIC | Facility: CLINIC | Age: 58
End: 2023-07-31
Payer: MEDICARE

## 2023-09-14 ENCOUNTER — TELEPHONE (OUTPATIENT)
Dept: FAMILY MEDICINE CLINIC | Facility: CLINIC | Age: 58
End: 2023-09-14
Payer: MEDICARE

## 2023-09-14 RX ORDER — ORAL SEMAGLUTIDE 3 MG/1
3 TABLET ORAL DAILY
Qty: 30 TABLET | Refills: 5 | Status: SHIPPED | OUTPATIENT
Start: 2023-09-14

## 2023-09-14 NOTE — TELEPHONE ENCOUNTER
I can send the pill but not sure if it will be approved for pre-diabetes. Have her check back with us in 1 week about coverage. Thanks!

## 2023-09-14 NOTE — TELEPHONE ENCOUNTER
PHONE CALL FROM PATIENT.  INSURANCE NOT COVERING THE OZEMPIC.  SHE WAS TOLD THERE WAS A PILL FORM THAT WOULD BE CHEAPER.  SHE WOULD LIKE THE PILL.     PHARMACY:  [facility]    PLEASE CALL [phone]

## 2023-09-15 NOTE — TELEPHONE ENCOUNTER
Pharmacy Name: [facility] [id number]  [place], [place] - [address] [facility] [address] - [phone]  - [phone] FX     Reference Number (if applicable): N/A    Pharmacy representative name: LUANA    Pharmacy representative phone number: [phone]     What medication are you calling in regards to: Semaglutide (Rybelsus) 3 MG tablet     What question does the pharmacy have: THE PHARMACY STATES IT IS MISSING THE QUALIFYING ICD-10 CODE AND IT ALSO MIGHT REQUIRES A PRIOR AUTHORIZATION     Who is the provider that prescribed the medication: CRYSTAL GILBERT     Additional notes: PLEASE CALL AND ADVISE

## 2024-01-03 DIAGNOSIS — G25.81 RLS (RESTLESS LEGS SYNDROME): ICD-10-CM

## 2024-01-03 DIAGNOSIS — E78.2 MIXED HYPERLIPIDEMIA: ICD-10-CM

## 2024-01-03 RX ORDER — ATORVASTATIN CALCIUM 40 MG/1
40 TABLET, FILM COATED ORAL DAILY
Qty: 30 TABLET | Refills: 0 | Status: SHIPPED | OUTPATIENT
Start: 2024-01-03

## 2024-01-03 RX ORDER — ROPINIROLE 3 MG/1
3 TABLET, FILM COATED ORAL NIGHTLY
Qty: 30 TABLET | Refills: 0 | Status: SHIPPED | OUTPATIENT
Start: 2024-01-03

## 2024-01-03 NOTE — TELEPHONE ENCOUNTER
Was due for follow up in Dec. She needs to be established with someone else. Please get her scheduled. Sending 1 month

## 2024-01-04 NOTE — TELEPHONE ENCOUNTER
HUB RELAY  Was due for follow up in Dec. She needs to be established with someone else. Please get her scheduled. Sending 1 month.  MADELINE

## 2024-01-04 NOTE — TELEPHONE ENCOUNTER
Name: Griselda Pizarro    Relationship: Self    Best Callback Number: [phone]     [facility] PROVIDED THE RELAY MESSAGE FROM THE OFFICE   PATIENT VOICED UNDERSTANDING AND HAS NO FURTHER QUESTIONS AT THIS TIME    ADDITIONAL INFORMATION:  PATIENT REFUSED TO SCHEDULE A APPOINTMENT WITH THE OFFICE DUE TO ONLY WANTING TO BE SEEN IN THE OFFICE ONCE A YEAR FOR MEDICATION REFILLS AND SINCE HER INSURANCE IS NO LONGER IN NETWORK WITH THE OFFICE PATIENT STATED THAT SHE WOULD BE CHANGING PCP AND LOOKING INTO A DIFFERENT PCP OFFICE     Tanna Herrera RegSched Rep Staff Signed [id number]       HUB RELAY  Was due for follow up in Dec. She needs to be established with someone else. Please get her scheduled. Sending 1 month.  MADELINE

## 2024-01-31 DIAGNOSIS — E78.2 MIXED HYPERLIPIDEMIA: ICD-10-CM

## 2024-01-31 DIAGNOSIS — G25.81 RLS (RESTLESS LEGS SYNDROME): ICD-10-CM

## 2024-01-31 RX ORDER — ROPINIROLE 3 MG/1
3 TABLET, FILM COATED ORAL NIGHTLY
Qty: 30 TABLET | Refills: 0 | OUTPATIENT
Start: 2024-01-31

## 2024-01-31 RX ORDER — ATORVASTATIN CALCIUM 40 MG/1
40 TABLET, FILM COATED ORAL DAILY
Qty: 30 TABLET | Refills: 0 | OUTPATIENT
Start: 2024-01-31

## 2024-02-01 NOTE — TELEPHONE ENCOUNTER
Name: Griselda Pizarro      Relationship: Self      Best Callback Number: [phone]       HUB PROVIDED THE RELAY MESSAGE FROM THE OFFICE      PATIENT: VOICED UNDERSTANDING AND HAS NO FURTHER QUESTIONS AT THIS TIME    ADDITIONAL INFORMATION: PATIENT STATES SHE IS NOW SEEING ANOTHER DOCTOR FUNMILAYO TO INSURANCE.

## 2024-02-27 DIAGNOSIS — E78.2 MIXED HYPERLIPIDEMIA: ICD-10-CM

## 2024-02-27 RX ORDER — ATORVASTATIN CALCIUM 40 MG/1
40 TABLET, FILM COATED ORAL DAILY
Qty: 30 TABLET | Refills: 0 | OUTPATIENT
Start: 2024-02-27

## 2024-03-04 DIAGNOSIS — E78.2 MIXED HYPERLIPIDEMIA: ICD-10-CM

## 2024-03-05 RX ORDER — ATORVASTATIN CALCIUM 40 MG/1
40 TABLET, FILM COATED ORAL DAILY
Qty: 30 TABLET | Refills: 0 | Status: SHIPPED | OUTPATIENT
Start: 2024-03-05

## 2024-03-05 NOTE — TELEPHONE ENCOUNTER
I have sent multiple message since January that she needs to schedule with new provider & no one is call to get her in.

## 2025-03-08 DIAGNOSIS — E78.2 MIXED HYPERLIPIDEMIA: ICD-10-CM

## 2025-03-10 RX ORDER — ATORVASTATIN CALCIUM 40 MG/1
40 TABLET, FILM COATED ORAL DAILY
Qty: 30 TABLET | Refills: 0 | OUTPATIENT
Start: 2025-03-10

## 2025-03-10 NOTE — TELEPHONE ENCOUNTER
HUB TO RELAY  Lvm for pt to let us know if she has a new provider or needs to schedule an appt with our office

## 2025-03-16 DIAGNOSIS — E78.2 MIXED HYPERLIPIDEMIA: ICD-10-CM

## 2025-03-17 RX ORDER — ATORVASTATIN CALCIUM 40 MG/1
40 TABLET, FILM COATED ORAL DAILY
Qty: 30 TABLET | Refills: 0 | OUTPATIENT
Start: 2025-03-17

## 2025-03-18 DIAGNOSIS — E78.2 MIXED HYPERLIPIDEMIA: ICD-10-CM

## 2025-03-18 RX ORDER — ATORVASTATIN CALCIUM 40 MG/1
40 TABLET, FILM COATED ORAL DAILY
Qty: 30 TABLET | Refills: 0 | OUTPATIENT
Start: 2025-03-18

## 2025-03-21 DIAGNOSIS — E78.2 MIXED HYPERLIPIDEMIA: ICD-10-CM

## 2025-03-21 RX ORDER — ATORVASTATIN CALCIUM 40 MG/1
40 TABLET, FILM COATED ORAL DAILY
Qty: 30 TABLET | Refills: 0 | OUTPATIENT
Start: 2025-03-21

## 2025-03-25 DIAGNOSIS — E78.2 MIXED HYPERLIPIDEMIA: ICD-10-CM

## 2025-03-25 RX ORDER — ATORVASTATIN CALCIUM 40 MG/1
40 TABLET, FILM COATED ORAL DAILY
Qty: 30 TABLET | Refills: 0 | OUTPATIENT
Start: 2025-03-25

## (undated) DEVICE — TRY L/P SFTY A/20G